# Patient Record
Sex: MALE | Race: WHITE | NOT HISPANIC OR LATINO | Employment: STUDENT | URBAN - METROPOLITAN AREA
[De-identification: names, ages, dates, MRNs, and addresses within clinical notes are randomized per-mention and may not be internally consistent; named-entity substitution may affect disease eponyms.]

---
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
## 2024-08-11 ENCOUNTER — APPOINTMENT (OUTPATIENT)
Dept: URGENT CARE | Age: 19
End: 2024-08-11

## 2025-02-24 ENCOUNTER — TELEPHONE (OUTPATIENT)
Dept: FAMILY MEDICINE CLINIC | Facility: CLINIC | Age: 20
End: 2025-02-24

## 2025-03-04 ENCOUNTER — OFFICE VISIT (OUTPATIENT)
Dept: URGENT CARE | Facility: MEDICAL CENTER | Age: 20
End: 2025-03-04
Payer: COMMERCIAL

## 2025-03-04 VITALS
SYSTOLIC BLOOD PRESSURE: 140 MMHG | WEIGHT: 144 LBS | BODY MASS INDEX: 25.52 KG/M2 | HEIGHT: 63 IN | DIASTOLIC BLOOD PRESSURE: 84 MMHG | HEART RATE: 71 BPM | TEMPERATURE: 98.4 F | RESPIRATION RATE: 18 BRPM | OXYGEN SATURATION: 97 %

## 2025-03-04 DIAGNOSIS — N50.811 PAIN IN RIGHT TESTICLE: Primary | ICD-10-CM

## 2025-03-04 PROCEDURE — G0383 LEV 4 HOSP TYPE B ED VISIT: HCPCS | Performed by: PHYSICIAN ASSISTANT

## 2025-03-04 PROCEDURE — S9083 URGENT CARE CENTER GLOBAL: HCPCS | Performed by: PHYSICIAN ASSISTANT

## 2025-03-04 NOTE — PATIENT INSTRUCTIONS
Testicular pain  Referred to the ER  Follow up with PCP in 3-5 days.  Proceed to  ER if symptoms worsen.

## 2025-03-04 NOTE — PROGRESS NOTES
"  [facility]        NAME: Jose Clement is a 19 y.o. male  : 2005    MRN: [id number]  DATE: 2025  TIME: 4:30 PM    Assessment and Plan   Pain in right testicle [N50.811]  1. Pain in right testicle              Patient Instructions     Testicular pain  Referred to the ER  Follow up with PCP in 3-5 days.  Proceed to  ER if symptoms worsen.    Chief Complaint     Chief Complaint   Patient presents with    Testicle Pain     Has had reoccurring right testicle pain on and off since 12 years old.  Yesterday right testicle painful that leg was giving out.         History of Present Illness       19-year-old male who presents complaining of right testicular pain.  Patient states that he has had similar symptoms in the past but usually go away.  States the pain was so severe today that he felt like his right leg was going to give out.  Denies nausea, vomiting, penile discharge, trauma.    Testicle Pain  He complains of testicular pain.       Review of Systems   Review of Systems   Genitourinary:  Positive for testicular pain.         Current Medications     No current outpatient medications on file.    Current Allergies     Allergies as of 2025    (No Known Allergies)            The following portions of the patient's history were reviewed and updated as appropriate: allergies, current medications, past family history, past medical history, past social history, past surgical history and problem list.     History reviewed. No pertinent past medical history.    History reviewed. No pertinent surgical history.    Family History   Problem Relation Age of Onset    Hypertension Father          Medications have been verified.        Objective   /84   Pulse 71   Temp 98.4 °F (36.9 °C)   Resp 18   Ht 5' 3\" (1.6 m)   Wt 65.3 kg (144 lb)   SpO2 97%   BMI 25.51 kg/m²        Physical Exam     Physical Exam  Constitutional:       General: He is not in acute distress.     Appearance: He is " well-developed. He is not diaphoretic.   Cardiovascular:      Rate and Rhythm: Normal rate and regular rhythm.      Heart sounds: Normal heart sounds.   Pulmonary:      Effort: Pulmonary effort is normal. No respiratory distress.      Breath sounds: Normal breath sounds. No wheezing or rales.   Chest:      Chest wall: No tenderness.   Abdominal:      General: Abdomen is flat. Bowel sounds are normal. There is no distension.      Palpations: Abdomen is soft. There is no mass.      Tenderness: There is no abdominal tenderness. There is no guarding or rebound.      Hernia: A hernia is present. Hernia is present in the left inguinal area.   Genitourinary:     Penis: Normal.       Testes:         Right: Tenderness present.         Left: Mass, tenderness, swelling, testicular hydrocele or varicocele not present. Left testis is descended. Cremasteric reflex is present.       Epididymis:      Right: Normal.      Left: Normal.   Musculoskeletal:      Cervical back: Normal range of motion and neck supple.   Lymphadenopathy:      Cervical: No cervical adenopathy.         Patient referred to the emergency room and states he will call via car after discussing with family

## 2025-03-05 ENCOUNTER — HOSPITAL ENCOUNTER (EMERGENCY)
Facility: HOSPITAL | Age: 20
Discharge: HOME | End: 2025-03-05
Attending: EMERGENCY MEDICINE | Admitting: EMERGENCY MEDICINE
Payer: COMMERCIAL

## 2025-03-05 VITALS
OXYGEN SATURATION: 99 % | TEMPERATURE: 97.5 F | HEART RATE: 81 BPM | DIASTOLIC BLOOD PRESSURE: 82 MMHG | RESPIRATION RATE: 16 BRPM | SYSTOLIC BLOOD PRESSURE: 139 MMHG

## 2025-03-05 DIAGNOSIS — K40.21 BILATERAL RECURRENT INGUINAL HERNIA WITHOUT OBSTRUCTION OR GANGRENE: Primary | ICD-10-CM

## 2025-03-05 PROCEDURE — 99283 EMERGENCY DEPT VISIT LOW MDM: CPT

## 2025-03-05 PROCEDURE — 99283 EMERGENCY DEPT VISIT LOW MDM: CPT | Performed by: EMERGENCY MEDICINE

## 2025-03-05 NOTE — Clinical Note
Jose Clement was seen and treated in our emergency department on 3/5/2025.    No restrictions            Diagnosis:     Vai  .    He may return on this date: 03/06/2025         If you have any questions or concerns, please don't hesitate to call.      Elier Masters, DO    ______________________________           _______________          _______________  Hospital Representative                              Date                                Time

## 2025-03-05 NOTE — ED PROVIDER NOTES
Time reflects when diagnosis was documented in both MDM as applicable and the Disposition within this note       Time User Action Codes Description Comment    3/5/2025  8:25 AM Elier Masters Add [K40.21] Bilateral recurrent inguinal hernia without obstruction or gangrene           ED Disposition       ED Disposition   Discharge    Condition   Stable    Date/Time   Wed Mar 5, 2025  8:24 AM    Comment   Jose Clement discharge to home/self care.                   Assessment & Plan       Medical Decision Making  19-year-old female presenting to the emergency department with symptomatic bilateral inguinal hernias present times years, more symptomatic as of recent.  No signs of obstruction, strangulation.  Hernias are easily reducible.  After the hernia was reduced, patient was able to more clearly explained that it was the area of the hernia that was causing him problem and not actually the testicle itself.  No signs or symptoms of orchitis, epididymitis, torsion, or any other direct testicular related pathology.  Patient provided ambulatory referral to surgery and explained that he must call to set up appointment for reevaluation and surgical intervention per their team.  Patient understand supportive care such as manual reduction, NSAIDs as needed, Sacher.  Reviewed all findings both relevant and incidental with the patient at bedside. Pt verbalized understanding of findings, neccesary follow up, return to ED precautions. Pt agreed to review today's findings with their primary care provider. Pt non-toxic appearing upon discharge.       Amount and/or Complexity of Data Reviewed  Independent Historian: friend  External Data Reviewed: notes.    Risk  OTC drugs.             Medications - No data to display    ED Risk Strat Scores                                                History of Present Illness       Chief Complaint   Patient presents with    Testicle Pain     Pt with right testicular pain on/off, pt was sent here by  care now to r/o torsion. Pt was also told he possibly has B/L inguinal hernias. No pain with urination, no abdominal pain, no fevers.        No past medical history on file.   No past surgical history on file.   Family History   Problem Relation Age of Onset    Hypertension Father       Social History     Tobacco Use    Smoking status: Never    Smokeless tobacco: Never   Substance Use Topics    Alcohol use: Never    Drug use: Never      E-Cigarette/Vaping      E-Cigarette/Vaping Substances    Nicotine No     THC No     CBD No     Flavoring No       I have reviewed and agree with the history as documented.     19-year-old male, past medical history per chart, presenting to the emergency department with bilateral intermittent inguinal pain for approximately 7 years with episodes intermittently occurring over the last few days.  Last episode of similar approximately 6 months to 1 year ago.  Patient denies hematuria, dysuria, abdominal pain, change in p.o. intake, nausea, vomiting, diarrhea, trauma.  Does not currently sexually active.  No penile discharge, bleeding.  Evaluated in urgent care yesterday redirected to emergency department to rule out torsion per that provider.      Testicle Pain  Associated symptoms: no abdominal pain, no chest pain, no cough, no ear pain, no fever, no rash, no shortness of breath, no sore throat and no vomiting        Review of Systems   Constitutional:  Negative for chills and fever.   HENT:  Negative for ear pain and sore throat.    Eyes:  Negative for pain and visual disturbance.   Respiratory:  Negative for cough and shortness of breath.    Cardiovascular:  Negative for chest pain and palpitations.   Gastrointestinal:  Negative for abdominal pain and vomiting.   Genitourinary:  Positive for testicular pain. Negative for dysuria and hematuria.   Musculoskeletal:  Negative for arthralgias and back pain.   Skin:  Negative for color change and rash.   Neurological:  Negative for seizures  and syncope.   All other systems reviewed and are negative.          Objective       ED Triage Vitals [03/05/25 0800]   Temperature Pulse Blood Pressure Respirations SpO2 Patient Position - Orthostatic VS   97.5 °F (36.4 °C) 81 139/82 16 99 % Lying      Temp Source Heart Rate Source BP Location FiO2 (%) Pain Score    Oral -- Right arm -- 2      Vitals      Date and Time Temp Pulse SpO2 Resp BP Pain Score FACES Pain Rating User   03/05/25 0800 97.5 °F (36.4 °C) 81 99 % 16 139/82 2 -- CS            Physical Exam  Constitutional:       General: He is not in acute distress.     Appearance: He is not ill-appearing.   HENT:      Head: Normocephalic and atraumatic.      Nose: Nose normal.   Cardiovascular:      Rate and Rhythm: Normal rate.   Pulmonary:      Effort: Pulmonary effort is normal.   Abdominal:      General: Abdomen is flat.      Tenderness: There is no abdominal tenderness. There is no guarding.      Hernia: A hernia is present.   Genitourinary:     Penis: Normal and circumcised. No erythema, tenderness, discharge or swelling.       Testes: Normal.         Right: Mass, tenderness, swelling, testicular hydrocele or varicocele not present. Right testis is descended. Cremasteric reflex is present.          Left: Mass, tenderness, swelling, testicular hydrocele or varicocele not present. Left testis is descended. Cremasteric reflex is present.       Comments: ER technical partner Ines present in the room for evaluation.    Patient has bilateral inguinal hernias which are soft and nontender and easily reducible while patient is supine.  After the bilateral inguinal hernias were reduced, the testicles were evaluated independently and were without any tenderness.  Both are equally distended with cremasteric reflex present.  Skin:     General: Skin is warm and dry.   Neurological:      General: No focal deficit present.      Mental Status: He is alert.         Results Reviewed       None            No orders to  display       Procedures    ED Medication and Procedure Management   None     Patient's Medications    No medications on file       ED SEPSIS DOCUMENTATION   Time reflects when diagnosis was documented in both MDM as applicable and the Disposition within this note       Time User Action Codes Description Comment    3/5/2025  8:25 AM Elier Masters Add [K40.21] Bilateral recurrent inguinal hernia without obstruction or gangrene                  Elier Masters DO  03/05/25 0836

## 2025-03-06 ENCOUNTER — CONSULT (OUTPATIENT)
Dept: SURGERY | Facility: CLINIC | Age: 20
End: 2025-03-06
Payer: COMMERCIAL

## 2025-03-06 VITALS
TEMPERATURE: 98.2 F | HEIGHT: 63 IN | DIASTOLIC BLOOD PRESSURE: 70 MMHG | BODY MASS INDEX: 25.8 KG/M2 | OXYGEN SATURATION: 98 % | WEIGHT: 145.6 LBS | HEART RATE: 110 BPM | SYSTOLIC BLOOD PRESSURE: 122 MMHG

## 2025-03-06 DIAGNOSIS — K40.21 BILATERAL RECURRENT INGUINAL HERNIA WITHOUT OBSTRUCTION OR GANGRENE: ICD-10-CM

## 2025-03-06 DIAGNOSIS — N50.89 SCROTAL MASS: Primary | ICD-10-CM

## 2025-03-06 PROBLEM — Z71.3 NUTRITIONAL COUNSELING: Status: RESOLVED | Noted: 2022-03-08 | Resolved: 2025-03-06

## 2025-03-06 PROBLEM — Z71.82 EXERCISE COUNSELING: Status: RESOLVED | Noted: 2022-03-08 | Resolved: 2025-03-06

## 2025-03-06 PROCEDURE — 99242 OFF/OP CONSLTJ NEW/EST SF 20: CPT | Performed by: SURGERY

## 2025-03-06 NOTE — PROGRESS NOTES
"Name: Jose Clement      : 2005      MRN: [id number]  Encounter Provider:  Martínez Thorne MD  Encounter Date: 3/6/2025   Encounter department: [facility]  :  Assessment & Plan  Bilateral recurrent inguinal hernia without obstruction or gangrene    Orders:    Ambulatory Referral to General Surgery    Scrotal mass  Left side  Orders:    US scrotum and groin area; Future  Right scrotal pain    No clinically apparent groin hernia    Ultrasound of both groins and scrotum      History of Present Illness   Works at [employer]    Jose Clement is a 19 y.o. male who presents with scrotal pain.    HPI  Patient complains of scrotal pain especially on the right side for the last several months.  There is no definite relation to lifting or straining.  He does not have pain at rest.  He has never felt a groin lump.    There is no prior history of hernia or hernia surgeries.    Review of Systems as per HPI.  No urinary complaints    No other medical or surgical problems     Objective     /70 (BP Location: Right arm, Patient Position: Sitting, Cuff Size: Standard)   Pulse (!) 110   Temp 98.2 °F (36.8 °C) (Tympanic)   Ht 5' 3\" (1.6 m)   Wt 66 kg (145 lb 9.6 oz)   SpO2 98%   BMI 25.79 kg/m²      Physical Exam   Patient was examined in the standing position.  No groin hernia were seen or felt on finger invagination.  The right testicle is normal.  The left testicle is normal.  Lying above the left testicle is a scrotal mass same size as the testicle.  It is separate from the testicle and the upper border can be palpated.  It may be an epididymal cyst.          "

## 2025-03-07 ENCOUNTER — HOSPITAL ENCOUNTER (OUTPATIENT)
Dept: RADIOLOGY | Facility: MEDICAL CENTER | Age: 20
Discharge: HOME | End: 2025-03-07
Payer: COMMERCIAL

## 2025-03-07 ENCOUNTER — OFFICE VISIT (OUTPATIENT)
Dept: FAMILY MEDICINE CLINIC | Facility: CLINIC | Age: 20
End: 2025-03-07
Payer: COMMERCIAL

## 2025-03-07 VITALS
WEIGHT: 140.4 LBS | TEMPERATURE: 98 F | OXYGEN SATURATION: 97 % | HEART RATE: 68 BPM | DIASTOLIC BLOOD PRESSURE: 70 MMHG | SYSTOLIC BLOOD PRESSURE: 110 MMHG | BODY MASS INDEX: 24.88 KG/M2 | HEIGHT: 63 IN

## 2025-03-07 DIAGNOSIS — K40.90 LEFT INGUINAL HERNIA: ICD-10-CM

## 2025-03-07 DIAGNOSIS — K40.90 RIGHT INGUINAL HERNIA: Primary | ICD-10-CM

## 2025-03-07 DIAGNOSIS — N50.89 SCROTAL MASS: ICD-10-CM

## 2025-03-07 DIAGNOSIS — L72.9 SCROTAL CYST: ICD-10-CM

## 2025-03-07 PROCEDURE — 76870 US EXAM SCROTUM: CPT

## 2025-03-07 PROCEDURE — 99214 OFFICE O/P EST MOD 30 MIN: CPT | Performed by: FAMILY MEDICINE

## 2025-03-07 NOTE — PROGRESS NOTES
Name: Jose Clement      : 2005      MRN: [id number]  Encounter Provider: Scottie Rollins MD  Encounter Date: 3/7/2025   Encounter department: [facility]    Assessment & Plan  Right inguinal hernia  Pain involving the right inguinal area, possible right inguinal hernia noted on ultrasound  Will await full report from radiology  Patient has already been seen by general surgeon  We will have patient establish surgical date, complete preop blood work and return for preop evaluation       Left inguinal hernia  Similar finding on left inguinal hernia, no symptoms, may monitor for worsening symptoms or discussed with surgeon regarding treatment options       Scrotal cyst  Scrotal cysts, there seems to be of fullness above the left testes without significant discomfort, will await scrotal ultrasound result            History of Present Illness       HPI    Jose is a 19-year-old male patient here to establish care.  Patient's main concern today is regarding his recent ER visit on 3/5/2025 for inguinal pain.  Ultrasound was complete at this time demonstrated left inguinal hernia as well as a possible right inguinal hernia (unofficial report).  Patient denies any significant pain at a neutral sitting position, pain occasionally flareups when he transition position such as lying down to sitting or sitting to standing.  Patient is interested in surgical evaluation to help for treatment of hernia      Review of Systems   Constitutional:  Negative for chills and fever.   HENT:  Negative for congestion, rhinorrhea and sore throat.    Respiratory:  Negative for chest tightness and shortness of breath.    Cardiovascular:  Negative for chest pain.   Gastrointestinal:  Negative for abdominal pain, constipation, diarrhea, nausea and vomiting.   Neurological:  Negative for dizziness, light-headedness and headaches.   Psychiatric/Behavioral:  Negative for sleep disturbance.        No past medical history on  "file.  No past surgical history on file.  Family History   Problem Relation Age of Onset    Hypertension Father      Social History     Tobacco Use    Smoking status: Never    Smokeless tobacco: Never   Vaping Use    Vaping status: Never Used   Substance and Sexual Activity    Alcohol use: Yes     Alcohol/week: 1.0 standard drink of alcohol     Types: 1 Cans of beer per week    Drug use: Yes     Frequency: 2.0 times per week     Types: Marijuana    Sexual activity: Not Currently     Partners: Male     Birth control/protection: Condom Male     No current outpatient medications on file prior to visit.     No Known Allergies  Immunization History   Administered Date(s) Administered    COVID-19 MODERNA VACC 0.5 ML IM 03/21/2022    COVID-19 PFIZER VACCINE 0.3 ML IM 06/22/2021, 07/16/2021     Objective   /70 (BP Location: Left arm, Patient Position: Sitting, Cuff Size: Standard)   Pulse 68   Temp 98 °F (36.7 °C) (Temporal)   Ht 5' 3\" (1.6 m)   Wt 63.7 kg (140 lb 6.4 oz)   SpO2 97%   BMI 24.87 kg/m²     Physical Exam  Vitals reviewed.   Constitutional:       General: He is not in acute distress.     Appearance: Normal appearance. He is not ill-appearing, toxic-appearing or diaphoretic.   Cardiovascular:      Rate and Rhythm: Normal rate and regular rhythm.      Pulses: Normal pulses.      Heart sounds: Normal heart sounds. No murmur heard.  Pulmonary:      Effort: Pulmonary effort is normal. No respiratory distress.      Breath sounds: Normal breath sounds.   Abdominal:      General: Abdomen is flat.      Comments: Difficult to appreciate hernia on physical exam   Genitourinary:     Comments: Small fullness above the left testes    Musculoskeletal:         General: No swelling or deformity.   Skin:     General: Skin is warm and dry.      Capillary Refill: Capillary refill takes less than 2 seconds.      Coloration: Skin is not jaundiced.   Neurological:      General: No focal deficit present.      Mental " Status: He is alert.   Psychiatric:         Mood and Affect: Mood normal.

## 2025-04-03 ENCOUNTER — OFFICE VISIT (OUTPATIENT)
Dept: FAMILY MEDICINE CLINIC | Facility: CLINIC | Age: 20
End: 2025-04-03
Payer: COMMERCIAL

## 2025-04-03 VITALS
TEMPERATURE: 97.6 F | OXYGEN SATURATION: 98 % | HEART RATE: 86 BPM | DIASTOLIC BLOOD PRESSURE: 70 MMHG | SYSTOLIC BLOOD PRESSURE: 130 MMHG | BODY MASS INDEX: 25.12 KG/M2 | WEIGHT: 141.8 LBS | HEIGHT: 63 IN

## 2025-04-03 DIAGNOSIS — N50.3 EPIDIDYMAL CYST: ICD-10-CM

## 2025-04-03 DIAGNOSIS — Z00.00 ANNUAL PHYSICAL EXAM: Primary | ICD-10-CM

## 2025-04-03 DIAGNOSIS — K40.90 LEFT INGUINAL HERNIA: ICD-10-CM

## 2025-04-03 DIAGNOSIS — Z02.4 DRIVER'S PERMIT PE (PHYSICAL EXAMINATION): ICD-10-CM

## 2025-04-03 PROCEDURE — 99395 PREV VISIT EST AGE 18-39: CPT | Performed by: FAMILY MEDICINE

## 2025-04-03 PROCEDURE — 99213 OFFICE O/P EST LOW 20 MIN: CPT | Performed by: FAMILY MEDICINE

## 2025-04-03 NOTE — PROGRESS NOTES
Adult Annual Physical  Name: Jose Clement      : 2005      MRN: [id number]  Encounter Provider: Scottie Rollins MD  Encounter Date: 4/3/2025   Encounter department: [facility]    :  Assessment & Plan  Annual physical exam  Annual physical exam complete at this time  Will complete paperwork for 's permit       Left inguinal hernia  Left fat-containing inguinal hernia noted, patient denies any significant symptoms, will monitor, if there is significant discomfort or pain will refer to general surgery for evaluation and treatment       Epididymal cyst  Small benign epididymal cyst noted on ultrasound of the scrotum, not concerning, reassurance provided.     See above  's permit PE (physical examination)             Preventive Screenings:    - Lung cancer screening: screening not indicated   - Prostate cancer screening: screening not indicated     Immunizations:  - Immunizations due: Influenza, Tdap, HPV (Gardasil 9) and Hepatitis A         History of Present Illness       Adult Annual Physical:  Patient presents for annual physical. Pt here for physical and discuss US finding  Ultrasound demonstrated a small fat-containing inguinal hernia on the left side and small epididymal cyst which are definitively benign.  Patient also have paperwork for 's permit  Denies any neurological disorders, neuropsychiatric disorders, circulatory disorder, cardiac disorders, hypertension, incomplete epilepsy, uncontrolled diabetes, cognitive impairment, alcohol abuse, drug abuse, congestion causing repeat lapse of consciousness    .     Diet and Physical Activity:  - Diet/Nutrition: no special diet.  - Exercise: walking and 1-2 times a week on average.    Depression Screening:  - PHQ-2 Score: 0    General Health:  - Sleep: sleeps poorly and 4-6 hours of sleep on average.  - Hearing: normal hearing bilateral ears.  - Vision: no vision problems.  - Dental: brushes teeth once daily, no dental  "visits for > 1 year and does not floss.    /GYN Health:  - Follows with GYN: no.   - History of STDs: no     Health:  - History of STDs: no.   - Urinary symptoms: none.     Advanced Care Planning:  - Has an advanced directive?: no    - Has a durable medical POA?: no      Review of Systems   Constitutional:  Negative for chills and fever.   HENT:  Negative for congestion, rhinorrhea and sore throat.    Respiratory:  Negative for chest tightness and shortness of breath.    Cardiovascular:  Negative for chest pain.   Gastrointestinal:  Negative for abdominal pain.   Allergic/Immunologic: Positive for environmental allergies.   Neurological:  Negative for dizziness, light-headedness and headaches.   Psychiatric/Behavioral:  Negative for sleep disturbance.          Objective   /70 (BP Location: Left arm, Patient Position: Sitting, Cuff Size: Standard)   Pulse 86   Temp 97.6 °F (36.4 °C)   Ht 5' 3\" (1.6 m)   Wt 64.3 kg (141 lb 12.8 oz)   SpO2 98%   BMI 25.12 kg/m²     Physical Exam  Vitals reviewed.   Constitutional:       General: He is not in acute distress.     Appearance: Normal appearance. He is not ill-appearing, toxic-appearing or diaphoretic.   Cardiovascular:      Rate and Rhythm: Normal rate.      Pulses: Normal pulses.   Pulmonary:      Effort: Pulmonary effort is normal.   Abdominal:      General: Abdomen is flat.   Musculoskeletal:         General: No swelling or deformity.   Skin:     General: Skin is warm and dry.      Capillary Refill: Capillary refill takes less than 2 seconds.      Coloration: Skin is not jaundiced.   Neurological:      General: No focal deficit present.      Mental Status: He is alert.   Psychiatric:         Mood and Affect: Mood normal.         Scottie Rollins M.D.  Family Medicine    Please excuse any \"sound-alike\" errors that may have ocurred during the process of dictation. Parts of this note have been dictated and there may be errors present in the transcription process. " Thank you.